# Patient Record
Sex: MALE | Race: WHITE | NOT HISPANIC OR LATINO | Employment: FULL TIME | URBAN - NONMETROPOLITAN AREA
[De-identification: names, ages, dates, MRNs, and addresses within clinical notes are randomized per-mention and may not be internally consistent; named-entity substitution may affect disease eponyms.]

---

## 2020-08-12 ENCOUNTER — TRANSCRIBE ORDERS (OUTPATIENT)
Dept: ADMINISTRATIVE | Facility: HOSPITAL | Age: 45
End: 2020-08-12

## 2020-08-12 DIAGNOSIS — Z01.818 OTHER SPECIFIED PRE-OPERATIVE EXAMINATION: Primary | ICD-10-CM

## 2020-08-14 ENCOUNTER — LAB (OUTPATIENT)
Dept: LAB | Facility: HOSPITAL | Age: 45
End: 2020-08-14

## 2020-08-14 DIAGNOSIS — Z01.818 OTHER SPECIFIED PRE-OPERATIVE EXAMINATION: ICD-10-CM

## 2020-08-14 PROCEDURE — U0002 COVID-19 LAB TEST NON-CDC: HCPCS

## 2020-08-14 PROCEDURE — C9803 HOPD COVID-19 SPEC COLLECT: HCPCS

## 2020-08-14 PROCEDURE — U0004 COV-19 TEST NON-CDC HGH THRU: HCPCS

## 2020-08-16 LAB
REF LAB TEST METHOD: NORMAL
SARS-COV-2 RNA RESP QL NAA+PROBE: NOT DETECTED

## 2020-09-16 ENCOUNTER — TRANSCRIBE ORDERS (OUTPATIENT)
Dept: ADMINISTRATIVE | Facility: HOSPITAL | Age: 45
End: 2020-09-16

## 2020-09-16 DIAGNOSIS — Z01.818 OTHER SPECIFIED PRE-OPERATIVE EXAMINATION: Primary | ICD-10-CM

## 2020-09-18 ENCOUNTER — LAB (OUTPATIENT)
Dept: LAB | Facility: HOSPITAL | Age: 45
End: 2020-09-18

## 2020-09-18 DIAGNOSIS — Z01.818 OTHER SPECIFIED PRE-OPERATIVE EXAMINATION: ICD-10-CM

## 2020-09-18 PROCEDURE — U0004 COV-19 TEST NON-CDC HGH THRU: HCPCS

## 2020-09-18 PROCEDURE — C9803 HOPD COVID-19 SPEC COLLECT: HCPCS

## 2020-09-19 LAB — SARS-COV-2 RNA NOSE QL NAA+PROBE: NOT DETECTED

## 2021-07-26 ENCOUNTER — OFFICE VISIT (OUTPATIENT)
Dept: ORTHOPEDIC SURGERY | Facility: CLINIC | Age: 46
End: 2021-07-26

## 2021-07-26 ENCOUNTER — HOSPITAL ENCOUNTER (OUTPATIENT)
Dept: GENERAL RADIOLOGY | Facility: HOSPITAL | Age: 46
Discharge: HOME | End: 2021-07-26
Admitting: FAMILY MEDICINE

## 2021-07-26 VITALS
HEIGHT: 68 IN | DIASTOLIC BLOOD PRESSURE: 80 MMHG | HEART RATE: 75 BPM | SYSTOLIC BLOOD PRESSURE: 130 MMHG | WEIGHT: 238 LBS | BODY MASS INDEX: 36.07 KG/M2

## 2021-07-26 DIAGNOSIS — G89.29 CHRONIC PAIN OF RIGHT KNEE: Primary | ICD-10-CM

## 2021-07-26 DIAGNOSIS — E11.40 CONTROLLED TYPE 2 DIABETES MELLITUS WITH NEUROPATHY (HCC): ICD-10-CM

## 2021-07-26 DIAGNOSIS — M25.561 CHRONIC PAIN OF RIGHT KNEE: Primary | ICD-10-CM

## 2021-07-26 DIAGNOSIS — M22.2X1 PATELLOFEMORAL PAIN SYNDROME OF RIGHT KNEE: ICD-10-CM

## 2021-07-26 DIAGNOSIS — M25.561 RIGHT KNEE PAIN, UNSPECIFIED CHRONICITY: ICD-10-CM

## 2021-07-26 DIAGNOSIS — M25.561 RIGHT KNEE PAIN, UNSPECIFIED CHRONICITY: Primary | ICD-10-CM

## 2021-07-26 DIAGNOSIS — S80.01XA CONTUSION OF RIGHT KNEE, INITIAL ENCOUNTER: ICD-10-CM

## 2021-07-26 PROBLEM — I10 HTN (HYPERTENSION): Status: ACTIVE | Noted: 2021-07-26

## 2021-07-26 PROCEDURE — 73562 X-RAY EXAM OF KNEE 3: CPT

## 2021-07-26 PROCEDURE — 99203 OFFICE O/P NEW LOW 30 MIN: CPT | Performed by: FAMILY MEDICINE

## 2021-07-26 PROCEDURE — 73562 X-RAY EXAM OF KNEE 3: CPT | Performed by: RADIOLOGY

## 2021-07-26 RX ORDER — BUSPIRONE HYDROCHLORIDE 30 MG/1
30 TABLET ORAL 2 TIMES DAILY
COMMUNITY
Start: 2021-07-02 | End: 2022-10-13 | Stop reason: ALTCHOICE

## 2021-07-26 RX ORDER — LEVOTHYROXINE SODIUM 88 UG/1
88 TABLET ORAL DAILY
COMMUNITY
Start: 2021-07-02

## 2021-07-26 RX ORDER — ROPINIROLE 1 MG/1
1 TABLET, FILM COATED ORAL
COMMUNITY
Start: 2021-07-02 | End: 2022-10-13 | Stop reason: ALTCHOICE

## 2021-07-26 RX ORDER — GABAPENTIN 600 MG/1
600 TABLET ORAL 3 TIMES DAILY
COMMUNITY
Start: 2021-07-02 | End: 2022-10-13 | Stop reason: ALTCHOICE

## 2021-07-26 RX ORDER — CHLORTHALIDONE 25 MG/1
25 TABLET ORAL DAILY
COMMUNITY
Start: 2021-07-02 | End: 2022-10-13 | Stop reason: ALTCHOICE

## 2021-07-26 RX ORDER — IBUPROFEN 800 MG/1
800 TABLET ORAL 3 TIMES DAILY PRN
COMMUNITY
Start: 2021-07-02 | End: 2021-12-03

## 2021-07-26 RX ORDER — ESCITALOPRAM OXALATE 20 MG/1
20 TABLET ORAL DAILY
COMMUNITY
Start: 2021-07-02 | End: 2022-10-13 | Stop reason: ALTCHOICE

## 2021-07-26 RX ORDER — DM/PE/ACETAMINOPHEN/CHLORPHENR 10-5-325-2
1 TABLET, SEQUENTIAL ORAL DAILY
COMMUNITY
Start: 2021-07-02 | End: 2022-10-13 | Stop reason: ALTCHOICE

## 2021-07-26 RX ORDER — AMLODIPINE BESYLATE 10 MG/1
10 TABLET ORAL DAILY
COMMUNITY
Start: 2021-07-02

## 2021-07-26 NOTE — PROGRESS NOTES
"New Patient Visit      Patient: Joe Bobo  YOB: 1975  Date of Encounter: 07/26/2021  PCP: Carol Lainez APRN  Referring Provider: EMILY Wilson     Subjective   Joe Bobo is a 46 y.o. male who presents to the office today for evaluation of Pain, Initial Evaluation, and Edema of the Right Knee      Chief Complaint:   Chief Complaint   Patient presents with   • Right Knee - Pain, Initial Evaluation, Edema       HPI:   HPI  New patient presents complaining of right knee pain that has been going on since a fall in February.  Patient states that he slipped on the ice in the parking lot and landed flat on the front of his right knee.  Did not have much pain initially but fell again on his stairs about 30 minutes later doing the same thing and had significant pain and swelling after the injury.  Reports it lasted about 3 weeks before it finally got better.  Since then has been intermittently aching and swelling.  Sometimes gets the sensation of weakness and \"gives out.  Gets \"popping and cracking but that was going on before the injury.  Trying Voltaren gel which is limited help and takes ibuprofen 800 mg when he is flared up which does help.  Patient is actually feeling good today and has been for 3 to 4 days.  Has some knee braces at home that he wears sometimes.  When it is flared up complains of pain on the joint line and below the kneecap that aches \"like an abscessed tooth\" and sometimes become stabbing pain.  Active Problem List:  Patient Active Problem List   Diagnosis   • Controlled type 2 diabetes mellitus with neuropathy (CMS/HCC)   • HTN (hypertension)       Past Medical History:  Past Medical History:   Diagnosis Date   • Diabetes (CMS/HCC)    • High blood pressure    • Thyroid disease        Past Surgical History:  Past Surgical History:   Procedure Laterality Date   • CATARACT EXTRACTION     • CHOLECYSTECTOMY     • KNEE SURGERY Left     ACL reconstruction       Family " History:  Family History   Problem Relation Age of Onset   • Diabetes Father    • Heart disease Father    • Diabetes Maternal Grandmother    • Osteoarthritis Paternal Grandmother    • Rheum arthritis Paternal Grandmother    • Gout Paternal Grandmother    • Cancer Paternal Grandmother    • Diabetes Paternal Grandmother        Social History:  Social History     Socioeconomic History   • Marital status:      Spouse name: Not on file   • Number of children: Not on file   • Years of education: Not on file   • Highest education level: Not on file   Tobacco Use   • Smoking status: Never Smoker   • Smokeless tobacco: Current User     Types: Snuff   Vaping Use   • Vaping Use: Never used   Substance and Sexual Activity   • Alcohol use: Never       Medications:  Current Outpatient Medications   Medication Sig Dispense Refill   • amLODIPine (NORVASC) 10 MG tablet Take 10 mg by mouth Daily.     • busPIRone (BUSPAR) 30 MG tablet Take 30 mg by mouth 2 (Two) Times a Day.     • chlorthalidone (HYGROTON) 25 MG tablet Take 25 mg by mouth Daily.     • escitalopram (LEXAPRO) 20 MG tablet Take 20 mg by mouth Daily.     • gabapentin (NEURONTIN) 600 MG tablet Take 600 mg by mouth 3 (Three) Times a Day.     • GNP Vitamin D Maximum Strength 50 MCG (2000 UT) tablet Take 1 tablet by mouth Daily.     • ibuprofen (ADVIL,MOTRIN) 800 MG tablet Take 800 mg by mouth 3 (Three) Times a Day As Needed.     • Jardiance 10 MG tablet tablet Take 10 mg by mouth Daily.     • levothyroxine (SYNTHROID, LEVOTHROID) 137 MCG tablet Take 137 mcg by mouth Daily.     • metFORMIN (GLUCOPHAGE) 1000 MG tablet Take 1,000 mg by mouth 2 (Two) Times a Day.     • rOPINIRole (REQUIP) 1 MG tablet Take 1 mg by mouth every night at bedtime.       No current facility-administered medications for this visit.       Allergies:  Allergies   Allergen Reactions   • Lisinopril Hives       Review of Systems:   Review of Systems   Constitutional: Negative for activity change and  "fever.   Respiratory: Negative for shortness of breath and wheezing.    Cardiovascular: Negative for chest pain.   Musculoskeletal: Positive for arthralgias and myalgias.   Skin: Negative for color change and wound.   Neurological: Negative for weakness and numbness.       Physical Exam:   Physical Exam  Vitals and nursing note reviewed.   Constitutional:       General: He is not in acute distress.     Appearance: Normal appearance. He is obese.   Pulmonary:      Effort: Pulmonary effort is normal. No respiratory distress.   Musculoskeletal:      Right knee: Crepitus present. No effusion. Decreased range of motion. Tenderness present. No LCL laxity or MCL laxity. Abnormal patellar mobility.      Instability Tests: Anterior drawer test negative. Posterior drawer test negative. Anterior Lachman test negative. Medial Erlinda test negative and lateral Erlinda test negative.      Comments: Slightly restricted flexion.  Complains of mild discomfort on palpation along the joint line and proximal tibia.  Positive J sign with noticeable patella \"clunking\" on flexion extension.  Negative patellar compression sign.  No pain on resisted testing with normal strength.   Skin:     General: Skin is warm and dry.      Findings: No erythema.   Neurological:      General: No focal deficit present.      Mental Status: He is alert.      Sensory: No sensory deficit.      Motor: No weakness.       GENERAL: 46 y.o. male, alert and oriented X 3 in no acute distress.   Visit Vitals  /80   Pulse 75   Ht 172.7 cm (68\")   Wt 108 kg (238 lb)   BMI 36.19 kg/m²       Radiology Results:    XR Knee 3 View Right    Result Date: 7/26/2021  Negative plain films of the right knee  This report was finalized on 7/26/2021 9:50 AM by Dr. Librado Otto II, MD.      Assessment & Plan:   Assessment/Plan   Diagnoses and all orders for this visit:    1. Chronic pain of right knee (Primary)    2. Controlled type 2 diabetes mellitus with neuropathy " (CMS/[facility])    3. Patellofemoral pain syndrome of right knee    4. Contusion of right knee, initial encounter        MEDICATION ISSUES:  Discussed medication options and treatment plan of prescribed medication as well as the risks, benefits, and side effects including potential falls, possible impaired driving and metabolic adversities among others. Patient is agreeable to call the office with any worsening of symptoms or onset of side effects. Patient is agreeable to call 911 or go to the nearest ER should he/she begin having SI/HI.     MEDS ORDERED DURING VISIT:  No orders of the defined types were placed in this encounter.  Patient's knee is looking pretty good today other than some patellofemoral tracking issues.  I have given him home exercises and a lateral J knee brace without hinges to wear when he is being active.  Patient will follow up in 1 month for reevaluation or sooner if he has another flareup.  Recommend he take his ibuprofen every 8 hours if his knee starts to flareup again.  Consider PT or advanced imaging if pain continues.           This document has been electronically signed by Rich Carrillo DO   July 26, 2021 10:53 EDT    Part of this note may be an electronic transcription/translation of spoken language to printed text using the Dragon Dictation System.

## 2021-08-27 ENCOUNTER — TELEPHONE (OUTPATIENT)
Dept: ORTHOPEDIC SURGERY | Facility: CLINIC | Age: 46
End: 2021-08-27

## 2021-08-27 NOTE — TELEPHONE ENCOUNTER
Called patient to check on him since he missed his appt with Dr. Carrillo this morning. No answer, LVM.

## 2021-09-01 ENCOUNTER — TELEPHONE (OUTPATIENT)
Dept: ORTHOPEDIC SURGERY | Facility: CLINIC | Age: 46
End: 2021-09-01

## 2021-09-01 NOTE — TELEPHONE ENCOUNTER
Caller: ALETHA    Relationship: [facility] IN [place]    Best call back number: [phone]    What form or medical record are you requesting: VISIT NOTES FORM 7/26/2021    Who is requesting this form or medical record from you: PCP OFFICE    How would you like to receive the form or medical records (pick-up, mail, fax): FAX  If fax, what is the fax number: [phone]

## 2021-12-03 ENCOUNTER — HOSPITAL ENCOUNTER (OUTPATIENT)
Dept: GENERAL RADIOLOGY | Facility: HOSPITAL | Age: 46
Discharge: HOME | End: 2021-12-03
Admitting: FAMILY MEDICINE

## 2021-12-03 ENCOUNTER — OFFICE VISIT (OUTPATIENT)
Dept: ORTHOPEDIC SURGERY | Facility: CLINIC | Age: 46
End: 2021-12-03

## 2021-12-03 VITALS
DIASTOLIC BLOOD PRESSURE: 82 MMHG | SYSTOLIC BLOOD PRESSURE: 126 MMHG | HEART RATE: 65 BPM | WEIGHT: 238 LBS | HEIGHT: 68 IN | BODY MASS INDEX: 36.07 KG/M2

## 2021-12-03 DIAGNOSIS — S46.012A ROTATOR CUFF STRAIN, LEFT, INITIAL ENCOUNTER: ICD-10-CM

## 2021-12-03 DIAGNOSIS — M25.512 LEFT SHOULDER PAIN, UNSPECIFIED CHRONICITY: Primary | ICD-10-CM

## 2021-12-03 DIAGNOSIS — M25.512 ACUTE PAIN OF LEFT SHOULDER: Primary | ICD-10-CM

## 2021-12-03 DIAGNOSIS — M19.012 OSTEOARTHRITIS OF LEFT AC (ACROMIOCLAVICULAR) JOINT: ICD-10-CM

## 2021-12-03 DIAGNOSIS — S46.912A STRAIN OF LEFT SHOULDER, INITIAL ENCOUNTER: ICD-10-CM

## 2021-12-03 DIAGNOSIS — M25.512 LEFT SHOULDER PAIN, UNSPECIFIED CHRONICITY: ICD-10-CM
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
## 2021-12-03 DIAGNOSIS — M19.012 PRIMARY OSTEOARTHRITIS OF LEFT SHOULDER: ICD-10-CM

## 2021-12-03 DIAGNOSIS — S46.212A BICEPS STRAIN, LEFT, INITIAL ENCOUNTER: ICD-10-CM

## 2021-12-03 PROCEDURE — 73030 X-RAY EXAM OF SHOULDER: CPT | Performed by: RADIOLOGY

## 2021-12-03 PROCEDURE — 20610 DRAIN/INJ JOINT/BURSA W/O US: CPT | Performed by: FAMILY MEDICINE

## 2021-12-03 PROCEDURE — 99214 OFFICE O/P EST MOD 30 MIN: CPT | Performed by: FAMILY MEDICINE

## 2021-12-03 PROCEDURE — 73030 X-RAY EXAM OF SHOULDER: CPT

## 2021-12-03 RX ORDER — CELECOXIB 100 MG/1
100 CAPSULE ORAL 2 TIMES DAILY
Qty: 60 CAPSULE | Refills: 1 | Status: SHIPPED | OUTPATIENT
Start: 2021-12-03 | End: 2021-12-20 | Stop reason: SDUPTHER

## 2021-12-03 RX ORDER — PREGABALIN 100 MG/1
100 CAPSULE ORAL 2 TIMES DAILY
COMMUNITY

## 2021-12-03 RX ORDER — TRIAMCINOLONE ACETONIDE 40 MG/ML
40 INJECTION, SUSPENSION INTRA-ARTICULAR; INTRAMUSCULAR
Status: COMPLETED | OUTPATIENT
Start: 2021-12-03 | End: 2021-12-03

## 2021-12-03 RX ORDER — BUPIVACAINE HYDROCHLORIDE 5 MG/ML
4 INJECTION, SOLUTION EPIDURAL; INTRACAUDAL
Status: COMPLETED | OUTPATIENT
Start: 2021-12-03 | End: 2021-12-03

## 2021-12-03 RX ADMIN — TRIAMCINOLONE ACETONIDE 40 MG: 40 INJECTION, SUSPENSION INTRA-ARTICULAR; INTRAMUSCULAR at 12:42

## 2021-12-03 RX ADMIN — BUPIVACAINE HYDROCHLORIDE 4 ML: 5 INJECTION, SOLUTION EPIDURAL; INTRACAUDAL at 12:42

## 2021-12-03 NOTE — PROGRESS NOTES
Follow Up Visit      Patient: Joe Bobo  YOB: 1975  Date of Encounter: 12/03/2021  PCP: Carol Lainez APRN  Referring Provider: No ref. provider found     Subjective   Joe Bobo is a 46 y.o. male who presents to the office today for evaluation of Pain and New Problem of the Left Shoulder      Chief Complaint   Patient presents with   • Left Shoulder - Pain, New Problem       HPI  An established patient presents for new problem complaining of left shoulder pain going on for the last 6 weeks and getting worse.  Patient has had 2 falls in the last 6 weeks which resulted in him landing directly onto the shoulder.  He did not catch himself.  Getting significant pain in the anterior lateral shoulder as of late.  Limited range of motion in AA in the front of the shoulder.  Hurts when he tries to raise it.  Gets a stabbing pain that radiates down into the bicep and deltoid.  Already on NSAIDs and pain medicine which is not helping.  The knee which she was seen previously for is doing well but he does need a bigger knee brace.  Thinks his other one is too small  Patient Active Problem List   Diagnosis   • Controlled type 2 diabetes mellitus with neuropathy (HCC)   • HTN (hypertension)   • Osteoarthritis of left AC (acromioclavicular) joint   • Primary osteoarthritis of left shoulder       Past Medical History:   Diagnosis Date   • Diabetes (HCC)    • High blood pressure    • Thyroid disease        Allergies   Allergen Reactions   • Lisinopril Hives       Review of Systems   Constitutional: Positive for activity change. Negative for fever.   Respiratory: Negative for shortness of breath and wheezing.    Cardiovascular: Negative for chest pain.   Musculoskeletal: Positive for arthralgias and myalgias. Negative for neck pain and neck stiffness.   Skin: Negative for color change and wound.   Neurological: Negative for weakness and numbness.       Visit Vitals  /82   Pulse 65   Ht 172.7 cm  "(68\")   Wt 108 kg (238 lb)   BMI 36.19 kg/m²     46 y.o.male  Physical Exam  Vitals and nursing note reviewed.   Constitutional:       General: He is not in acute distress.     Appearance: Normal appearance.   Pulmonary:      Effort: Pulmonary effort is normal. No respiratory distress.   Musculoskeletal:      Left shoulder: Tenderness present. Decreased range of motion. Decreased strength. Normal pulse.      Cervical back: No spasms or tenderness. No pain with movement. Normal range of motion.      Comments: Left shoulder: Tender over AC, anterior joint biceps tendon and coracoid.  Noticeable pain on resisted testing of biceps and supraspinatus with supraspinatus weakness but appear to be intact.  Mild pain without weakness on testing of other shoulder muscles.  Pain on impingement crossover testing.  Fairly restricted active range of motion in elevation and abduction particularly but notably improved on passive range of motion.    Somewhat improved after injection but still have noticeable pain and weakness of supraspinatus.   Skin:     General: Skin is warm and dry.      Findings: No erythema.   Neurological:      General: No focal deficit present.      Mental Status: He is alert.      Sensory: No sensory deficit.      Motor: No weakness.         Radiology Results:    XR Shoulder 2+ View Left    Result Date: 12/3/2021  Mild arthritic changes in the left shoulder.  This report was finalized on 12/3/2021 2:16 PM by Dr. Librado Otto II, MD.         X-ray left shoulder: 12/3/21: My preliminary interpretation: Mild degenerative changes of AC and glenohumeral joints with inferior humeral head spurring.  No acute fractures or bony abnormalities      Large Joint Arthrocentesis: L subacromial bursa  Date/Time: 12/3/2021 12:42 PM  Consent given by: patient  Site marked: site marked  Timeout: Immediately prior to procedure a time out was called to verify the correct patient, procedure, equipment, support staff and " site/side marked as required   Supporting Documentation  Indications: pain   Procedure Details  Location: shoulder - L subacromial bursa  Needle size: 22 G  Approach: posterior  Medications administered: 40 mg triamcinolone acetonide 40 MG/ML; 4 mL bupivacaine (PF) 0.5 %  Patient tolerance: patient tolerated the procedure well with no immediate complications       Injection site in the posterior shoulder was cleaned with alcohol and iodine.  Anesthesia with ethyl chloride.  Then using sterile technique the subacromial space was accessed with a 22-gauge 1.5 inch needle injected with 4 cc of 0.5% Marcaine and 40 mg of Kenalog.  Injection site was covered with sterile bandage.  There were no immediate adverse effects and negligible blood loss..  Follow-up care and precautions were discussed.    Assessment/Plan   Diagnoses and all orders for this visit:    1. Acute pain of left shoulder (Primary)  -     celecoxib (CeleBREX) 100 MG capsule; Take 1 capsule by mouth 2 (Two) Times a Day.  Dispense: 60 capsule; Refill: 1  -     Large Joint Arthrocentesis: L subacromial bursa    2. Strain of left shoulder, initial encounter  -     celecoxib (CeleBREX) 100 MG capsule; Take 1 capsule by mouth 2 (Two) Times a Day.  Dispense: 60 capsule; Refill: 1  -     Large Joint Arthrocentesis: L subacromial bursa    3. Biceps strain, left, initial encounter  -     celecoxib (CeleBREX) 100 MG capsule; Take 1 capsule by mouth 2 (Two) Times a Day.  Dispense: 60 capsule; Refill: 1  -     Large Joint Arthrocentesis: L subacromial bursa    4. Rotator cuff strain, left, initial encounter  -     celecoxib (CeleBREX) 100 MG capsule; Take 1 capsule by mouth 2 (Two) Times a Day.  Dispense: 60 capsule; Refill: 1  -     Large Joint Arthrocentesis: L subacromial bursa    5. Injury due to fall, initial encounter  -     celecoxib (CeleBREX) 100 MG capsule; Take 1 capsule by mouth 2 (Two) Times a Day.  Dispense: 60 capsule; Refill: 1  -     Large Joint  Arthrocentesis: L subacromial bursa    6. Primary osteoarthritis of left shoulder  -     celecoxib (CeleBREX) 100 MG capsule; Take 1 capsule by mouth 2 (Two) Times a Day.  Dispense: 60 capsule; Refill: 1  -     Large Joint Arthrocentesis: L subacromial bursa    7. Osteoarthritis of left AC (acromioclavicular) joint  -     celecoxib (CeleBREX) 100 MG capsule; Take 1 capsule by mouth 2 (Two) Times a Day.  Dispense: 60 capsule; Refill: 1  -     Large Joint Arthrocentesis: L subacromial bursa         MEDS ORDERED DURING VISIT:  New Medications Ordered This Visit   Medications   • celecoxib (CeleBREX) 100 MG capsule     Sig: Take 1 capsule by mouth 2 (Two) Times a Day.     Dispense:  60 capsule     Refill:  1     MEDICATION ISSUES:  Discussed medication options and treatment plan of prescribed medication as well as the risks, benefits, and side effects including potential falls, possible impaired driving and metabolic adversities among others. Patient is agreeable to call the office with any worsening of symptoms or onset of side effects. Patient is agreeable to call 911 or go to the nearest ER should he/she begin having SI/HI.     Discussion:  Steroid injection today in the office with some relief of pain.  Patient given home exercises and changed his NSAID to Celebrex.  Cautioned patient about use of NSAIDs with SSRIs and discussed signs and symptoms of GI bleed to watch for.  Patient already using topical NSAID which is not helping.  Has been taking ibuprofen without issue.  Follow-up in 2 weeks if not improving consider PT or further intervention.  Content consider MRI if continues to have supraspinatus weakness or does not respond to conservative therapy.             This document has been electronically signed by Rich Carrillo DO   December 3, 2021 14:20 EST    Part of this note may be an electronic transcription/translation of spoken language to printed text using the Dragon Dictation System.

## 2021-12-20 ENCOUNTER — OFFICE VISIT (OUTPATIENT)
Dept: ORTHOPEDIC SURGERY | Facility: CLINIC | Age: 46
End: 2021-12-20

## 2021-12-20 ENCOUNTER — TELEPHONE (OUTPATIENT)
Dept: ORTHOPEDIC SURGERY | Facility: CLINIC | Age: 46
End: 2021-12-20

## 2021-12-20 VITALS
BODY MASS INDEX: 36.07 KG/M2 | HEART RATE: 65 BPM | WEIGHT: 238 LBS | HEIGHT: 68 IN | SYSTOLIC BLOOD PRESSURE: 122 MMHG | DIASTOLIC BLOOD PRESSURE: 80 MMHG

## 2021-12-20 DIAGNOSIS — M25.512 ACUTE PAIN OF LEFT SHOULDER: Primary | ICD-10-CM

## 2021-12-20 DIAGNOSIS — M19.012 OSTEOARTHRITIS OF LEFT AC (ACROMIOCLAVICULAR) JOINT: ICD-10-CM
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
## 2021-12-20 DIAGNOSIS — S46.112A RUPTURE LONG HEAD BICEPS TENDON, LEFT, INITIAL ENCOUNTER: ICD-10-CM

## 2021-12-20 DIAGNOSIS — S46.912A STRAIN OF LEFT SHOULDER, INITIAL ENCOUNTER: ICD-10-CM

## 2021-12-20 DIAGNOSIS — M19.012 PRIMARY OSTEOARTHRITIS OF LEFT SHOULDER: ICD-10-CM

## 2021-12-20 DIAGNOSIS — S46.012A ROTATOR CUFF STRAIN, LEFT, INITIAL ENCOUNTER: ICD-10-CM

## 2021-12-20 DIAGNOSIS — S46.212A BICEPS STRAIN, LEFT, INITIAL ENCOUNTER: ICD-10-CM

## 2021-12-20 PROCEDURE — 76882 US LMTD JT/FCL EVL NVASC XTR: CPT | Performed by: FAMILY MEDICINE

## 2021-12-20 PROCEDURE — 99214 OFFICE O/P EST MOD 30 MIN: CPT | Performed by: FAMILY MEDICINE

## 2021-12-20 RX ORDER — CELECOXIB 200 MG/1
200 CAPSULE ORAL 2 TIMES DAILY
Qty: 60 CAPSULE | Refills: 1 | Status: SHIPPED | OUTPATIENT
Start: 2021-12-20 | End: 2022-10-13 | Stop reason: ALTCHOICE

## 2021-12-20 NOTE — TELEPHONE ENCOUNTER
Called patient but someone else answered the phone and I told her that I will try again later, what I wanted to tell him is to let him know that he has met his deductible but out of pocket has not been met. Dr. Carrillo had ordered an MRI so either way he will be paying something.

## 2021-12-20 NOTE — TELEPHONE ENCOUNTER
Called and said he looked at his deductible and it said the balance is zero. I mentioned to him that the out of pocket he still owed on. He is going to wait till after the new year to do the MRI

## 2021-12-20 NOTE — PROGRESS NOTES
"Follow Up Visit      Patient: Joe Bobo  YOB: 1975  Date of Encounter: 12/20/2021  PCP: Carol Lainez APRN  Referring Provider: No ref. provider found     Subjective   Joe Bobo is a 46 y.o. male who presents to the office today for evaluation of Follow-up and Pain of the Left Shoulder      Chief Complaint   Patient presents with   • Left Shoulder - Follow-up, Pain       HPI  Patient presents for follow-up for left shoulder pain and strain.  Had a steroid injection in the subacromial space last visit.  Reports that he did have significant improvement in his global shoulder pain after the steroid.  He is able to move easier and with better range of motion.  He is now noticing that he still having significant pain in the anterior shoulder and down the bicep with a \"pulling sensation\" when he raises his arm.  Celebrex is helpful but is not quite doing enough.  Still getting significant pain when doing certain motions including lifting and pulling  Patient Active Problem List   Diagnosis   • Controlled type 2 diabetes mellitus with neuropathy (HCC)   • HTN (hypertension)   • Osteoarthritis of left AC (acromioclavicular) joint   • Primary osteoarthritis of left shoulder       Past Medical History:   Diagnosis Date   • Diabetes (HCC)    • High blood pressure    • Thyroid disease        Allergies   Allergen Reactions   • Lisinopril Hives       Review of Systems   Constitutional: Positive for activity change. Negative for fever.   Respiratory: Negative for shortness of breath and wheezing.    Cardiovascular: Negative for chest pain.   Musculoskeletal: Positive for arthralgias and myalgias. Negative for neck pain and neck stiffness.   Skin: Negative for color change and wound.   Neurological: Negative for weakness and numbness.       Visit Vitals  /80   Pulse 65   Ht 172.7 cm (68\")   Wt 108 kg (238 lb)   BMI 36.19 kg/m²     46 y.o.male  Physical Exam  Vitals and nursing note reviewed. " "  Constitutional:       General: He is not in acute distress.     Appearance: Normal appearance.   Pulmonary:      Effort: Pulmonary effort is normal. No respiratory distress.   Musculoskeletal:      Left shoulder: Tenderness present. Decreased range of motion. Decreased strength. Normal pulse.      Cervical back: No spasms or tenderness. No pain with movement. Normal range of motion.      Comments: Left shoulder: Tender over biceps tendon and coracoid.  Noticeable pain on resisted testing of biceps and supraspinatus with supraspinatus weakness but appear to be intact. \"David arm\" bicep deformity.  painless and without weakness on testing of other shoulder muscles.  Negative impingement and crossover testing.  Fairly restricted active range of motion in elevation and abduction particularly with significant pain.     Skin:     General: Skin is warm and dry.      Findings: No erythema.   Neurological:      General: No focal deficit present.      Mental Status: He is alert.      Sensory: No sensory deficit.      Motor: No weakness.     Procedure:   MSK ultrasound Limited- left shoulder  Consent given by patient for in-office study.  Images saved locally to machine memory  Indication: Left shoulder pain after fall.  Concern for tendon rupture on exam    1.  Abnormal biceps tendon with significant fluid in tendon sheath.  Fragmented heterogenous appearance of tendon consistent with tear  2.  AC joint degenerative changes  3. Supraspinatus: Abnormal heterogenous appearance concerning for partial tearing    Radiology Results:    XR Shoulder 2+ View Left    Result Date: 12/3/2021  Mild arthritic changes in the left shoulder.  This report was finalized on 12/3/2021 2:16 PM by Dr. Librado Otto II, MD.        Assessment/Plan   Diagnoses and all orders for this visit:    1. Acute pain of left shoulder (Primary)  -     celecoxib (CeleBREX) 200 MG capsule; Take 1 capsule by mouth 2 (Two) Times a Day.  Dispense: 60 capsule; " Refill: 1  -     MRI Shoulder Left Without Contrast; Future    2. Strain of left shoulder, initial encounter  -     celecoxib (CeleBREX) 200 MG capsule; Take 1 capsule by mouth 2 (Two) Times a Day.  Dispense: 60 capsule; Refill: 1  -     MRI Shoulder Left Without Contrast; Future    3. Biceps strain, left, initial encounter  -     celecoxib (CeleBREX) 200 MG capsule; Take 1 capsule by mouth 2 (Two) Times a Day.  Dispense: 60 capsule; Refill: 1  -     MRI Shoulder Left Without Contrast; Future    4. Rotator cuff strain, left, initial encounter  -     celecoxib (CeleBREX) 200 MG capsule; Take 1 capsule by mouth 2 (Two) Times a Day.  Dispense: 60 capsule; Refill: 1  -     MRI Shoulder Left Without Contrast; Future    5. Injury due to fall, initial encounter  -     celecoxib (CeleBREX) 200 MG capsule; Take 1 capsule by mouth 2 (Two) Times a Day.  Dispense: 60 capsule; Refill: 1  -     MRI Shoulder Left Without Contrast; Future    6. Primary osteoarthritis of left shoulder  -     celecoxib (CeleBREX) 200 MG capsule; Take 1 capsule by mouth 2 (Two) Times a Day.  Dispense: 60 capsule; Refill: 1  -     MRI Shoulder Left Without Contrast; Future    7. Osteoarthritis of left AC (acromioclavicular) joint  -     celecoxib (CeleBREX) 200 MG capsule; Take 1 capsule by mouth 2 (Two) Times a Day.  Dispense: 60 capsule; Refill: 1  -     MRI Shoulder Left Without Contrast; Future    8. Rupture long head biceps tendon, left, initial encounter  -     MRI Shoulder Left Without Contrast; Future         MEDS ORDERED DURING VISIT:  No orders of the defined types were placed in this encounter.    MEDICATION ISSUES:  Discussed medication options and treatment plan of prescribed medication as well as the risks, benefits, and side effects including potential falls, possible impaired driving and metabolic adversities among others. Patient is agreeable to call the office with any worsening of symptoms or onset of side effects. Patient is  agreeable to call 911 or go to the nearest ER should he/she begin having SI/HI.     Discussion:  Increase Celebrex to 200 mg twice daily.  Ideally will not be on this dose for long-term    Referred to physical therapy for left shoulder    MRI left shoulder ordered due to rupture of left bicep tendon seen on MSK US in office and concern for injury to supraspinatus/rotator cuff and labrum.  Follow-up after 2 weeks of PT or after MRI which ever comes first.  Consider further intervention or surgical referral depending on image results and response to therapy.           This document has been electronically signed by Rich Carrillo DO   December 20, 2021 14:26 EST    Part of this note may be an electronic transcription/translation of spoken language to printed text using the Dragon Dictation System.

## 2022-10-13 ENCOUNTER — OFFICE VISIT (OUTPATIENT)
Dept: CARDIOLOGY | Facility: CLINIC | Age: 47
End: 2022-10-13

## 2022-10-13 VITALS
HEART RATE: 74 BPM | DIASTOLIC BLOOD PRESSURE: 84 MMHG | WEIGHT: 215 LBS | HEIGHT: 68 IN | BODY MASS INDEX: 32.58 KG/M2 | SYSTOLIC BLOOD PRESSURE: 133 MMHG | OXYGEN SATURATION: 96 %

## 2022-10-13 DIAGNOSIS — R01.1 HEART MURMUR: Primary | ICD-10-CM

## 2022-10-13 DIAGNOSIS — I10 ESSENTIAL HYPERTENSION: ICD-10-CM

## 2022-10-13 PROCEDURE — 99203 OFFICE O/P NEW LOW 30 MIN: CPT | Performed by: INTERNAL MEDICINE

## 2022-10-13 PROCEDURE — 93000 ELECTROCARDIOGRAM COMPLETE: CPT | Performed by: INTERNAL MEDICINE

## 2022-10-13 RX ORDER — BUPROPION HYDROCHLORIDE 150 MG/1
150 TABLET ORAL DAILY
COMMUNITY

## 2022-10-13 RX ORDER — HYDROCHLOROTHIAZIDE 12.5 MG/1
12.5 TABLET ORAL DAILY
COMMUNITY

## 2022-10-13 RX ORDER — ATORVASTATIN CALCIUM 20 MG/1
20 TABLET, FILM COATED ORAL DAILY
COMMUNITY

## 2022-10-13 RX ORDER — ASPIRIN 81 MG/1
81 TABLET ORAL DAILY
COMMUNITY

## 2022-10-13 NOTE — PROGRESS NOTES
Sherley Hopkins, APRN  Joe Bobo  1975  10/13/2022    Patient Active Problem List   Diagnosis   • Controlled type 2 diabetes mellitus with neuropathy (HCC)   • HTN (hypertension)   • Osteoarthritis of left AC (acromioclavicular) joint   • Primary osteoarthritis of left shoulder       Dear Sherley Hopkins, APRN:    Subjective     Joe Bobo is a 47 y.o. male with the problems as listed above, presents    Chief complaint: Recent finding of heart murmur at his PCPs office.    History of Present Illness: Mr. Bobo is a pleasant 47-year-old  male who was recently diagnosed to have a heart murmur at his PCPs office during recent office evaluation.  Hence he is referred to us for further cardiac evaluation management.  On further questioning he denies any complaints of shortness of breath or dizziness or syncope.  He denies any recent chest pains.  He has history of longstanding hypertension and diabetes mellitus.      Allergies   Allergen Reactions   • Lisinopril Hives       Current Outpatient Medications:   •  amLODIPine (NORVASC) 10 MG tablet, Take 10 mg by mouth Daily., Disp: , Rfl:   •  aspirin 81 MG EC tablet, Take 1 tablet by mouth Daily., Disp: , Rfl:   •  atorvastatin (LIPITOR) 20 MG tablet, Take 1 tablet by mouth Daily., Disp: , Rfl:   •  buPROPion XL (WELLBUTRIN XL) 150 MG 24 hr tablet, Take 1 tablet by mouth Daily., Disp: , Rfl:   •  empagliflozin (JARDIANCE) 25 MG tablet tablet, Take 1 tablet by mouth Daily., Disp: , Rfl:   •  hydroCHLOROthiazide (HYDRODIURIL) 12.5 MG tablet, Take 1 tablet by mouth Daily., Disp: , Rfl:   •  levothyroxine (SYNTHROID, LEVOTHROID) 88 MCG tablet, Take 1 tablet by mouth Daily., Disp: , Rfl:   •  metFORMIN (GLUCOPHAGE) 1000 MG tablet, Take 1,000 mg by mouth 2 (Two) Times a Day., Disp: , Rfl:   •  pregabalin (LYRICA) 100 MG capsule, Take 100 mg by mouth 2 (Two) Times a Day., Disp: , Rfl:     Past Medical History:   Diagnosis Date   • Diabetes (HCC)   "  • High blood pressure    • Hyperlipidemia    • Thyroid disease      Past Surgical History:   Procedure Laterality Date   • CATARACT EXTRACTION     • CHOLECYSTECTOMY     • KNEE SURGERY Left     ACL reconstruction     Family History   Problem Relation Age of Onset   • Diabetes Father    • Heart disease Father    • Diabetes Maternal Grandmother    • Osteoarthritis Paternal Grandmother    • Rheum arthritis Paternal Grandmother    • Gout Paternal Grandmother    • Cancer Paternal Grandmother    • Diabetes Paternal Grandmother      Social History     Tobacco Use   • Smoking status: Never   • Smokeless tobacco: Current     Types: Snuff   Vaping Use   • Vaping Use: Never used   Substance Use Topics   • Alcohol use: Never       Review of Systems   Constitutional: Positive for fever and malaise/fatigue.   HENT: Positive for hearing loss.    Eyes: Negative.    Cardiovascular: Negative.    Respiratory: Positive for shortness of breath.    Endocrine: Negative.    Hematologic/Lymphatic: Negative.    Skin: Negative.    Musculoskeletal: Positive for back pain, muscle cramps and myalgias.   Gastrointestinal: Negative.    Genitourinary: Negative.    Neurological: Positive for dizziness, headaches and numbness.   Psychiatric/Behavioral: Positive for depression. The patient has insomnia.        Objective   Blood pressure 133/84, pulse 74, height 172.7 cm (68\"), weight 97.5 kg (215 lb), SpO2 96 %.  Body mass index is 32.69 kg/m².      Vitals reviewed.   Constitutional:       Appearance: Well-developed.   Eyes:      Conjunctiva/sclera: Conjunctivae normal.   HENT:      Head: Normocephalic.   Neck:      Thyroid: No thyromegaly.      Vascular: No JVD.      Trachea: No tracheal deviation.   Pulmonary:      Effort: No respiratory distress.      Breath sounds: Normal breath sounds. No wheezing. No rales.   Cardiovascular:      PMI at left midclavicular line. Normal rate. Regular rhythm. Normal S1. Normal S2.      Murmurs: There is a mid " frequency midsystolic murmur at the URSB and LLSB.      No gallop. No click. No rub.   Pulses:     Intact distal pulses.   Edema:     Peripheral edema absent.   Abdominal:      General: Bowel sounds are normal.      Palpations: Abdomen is soft. There is no abdominal mass.      Tenderness: There is no abdominal tenderness.   Musculoskeletal:      Cervical back: Normal range of motion and neck supple. Skin:     General: Skin is warm and dry.   Neurological:      Mental Status: Alert and oriented to person, place, and time.      Cranial Nerves: No cranial nerve deficit.                 ECG 12 Lead    Date/Time: 10/13/2022 2:20 PM  Performed by: Andrew Jernigan MD  Authorized by: Andrew Jernigan MD   Rhythm: sinus rhythm  BPM: 74  Conduction: left anterior fascicular block  ST Segments: ST segments normal                Assessment & Plan :   Diagnosis Plan   1. Heart murmur (systolic)     2. Essential hypertension          Recommendations:  Orders Placed This Encounter   Procedures   • ECG 12 Lead   • Adult Transthoracic Echo Complete w/ Color, Spectral and Contrast if necessary per protocol        We will evaluatehis heart murmur further with an echo Doppler study.    Return in about 3 months (around 1/13/2023).    As always, I appreciate very much the opportunity to participate in the cardiovascular care of your patients.      With Best Regards,    Andrew Jernigan MD, [facility]    Dragon disclaimer:  Much of this encounter note is an electronic transcription/translation of spoken language to printed text. The electronic translation of spoken language may permit erroneous, or at times, nonsensical words or phrases to be inadvertently transcribed; Although I have reviewed the note for such errors, some may still exist.

## 2022-11-16 ENCOUNTER — HOSPITAL ENCOUNTER (OUTPATIENT)
Dept: CARDIOLOGY | Facility: HOSPITAL | Age: 47
Discharge: HOME | End: 2022-11-16
Admitting: INTERNAL MEDICINE

## 2022-11-16 DIAGNOSIS — R01.1 HEART MURMUR: ICD-10-CM

## 2022-11-16 PROCEDURE — 93306 TTE W/DOPPLER COMPLETE: CPT

## 2022-11-16 PROCEDURE — 93306 TTE W/DOPPLER COMPLETE: CPT | Performed by: INTERNAL MEDICINE

## 2022-11-21 LAB
BH CV ECHO MEAS - AO ROOT DIAM: 3.7 CM
BH CV ECHO MEAS - EDV(CUBED): 85.2 ML
BH CV ECHO MEAS - EDV(MOD-SP4): 34.2 ML
BH CV ECHO MEAS - EF(MOD-SP4): 65.2 %
BH CV ECHO MEAS - ESV(CUBED): 50.7 ML
BH CV ECHO MEAS - ESV(MOD-SP4): 11.9 ML
BH CV ECHO MEAS - FS: 15.9 %
BH CV ECHO MEAS - IVS/LVPW: 2 CM
BH CV ECHO MEAS - IVSD: 2 CM
BH CV ECHO MEAS - LA DIMENSION: 4.3 CM
BH CV ECHO MEAS - LAT PEAK E' VEL: 11.3 CM/SEC
BH CV ECHO MEAS - LV DIASTOLIC VOL/BSA (35-75): 16.2 CM2
BH CV ECHO MEAS - LV MASS(C)D: 266.9 GRAMS
BH CV ECHO MEAS - LV SYSTOLIC VOL/BSA (12-30): 5.6 CM2
BH CV ECHO MEAS - LVIDD: 4.4 CM
BH CV ECHO MEAS - LVIDS: 3.7 CM
BH CV ECHO MEAS - LVOT AREA: 5.3 CM2
BH CV ECHO MEAS - LVOT DIAM: 2.6 CM
BH CV ECHO MEAS - LVPWD: 1 CM
BH CV ECHO MEAS - MED PEAK E' VEL: 8.6 CM/SEC
BH CV ECHO MEAS - MV A MAX VEL: 98.5 CM/SEC
BH CV ECHO MEAS - MV E MAX VEL: 78.2 CM/SEC
BH CV ECHO MEAS - MV E/A: 0.79
BH CV ECHO MEAS - PA ACC TIME: 0.12 SEC
BH CV ECHO MEAS - PA PR(ACCEL): 23.7 MMHG
BH CV ECHO MEAS - SI(MOD-SP4): 10.6 ML/M2
BH CV ECHO MEAS - SV(MOD-SP4): 22.3 ML
BH CV ECHO MEASUREMENTS AVERAGE E/E' RATIO: 7.86
LEFT ATRIUM VOLUME INDEX: 30.1 ML/M2
MAXIMAL PREDICTED HEART RATE: 172 BPM
STRESS TARGET HR: 146 BPM

## 2023-01-18 ENCOUNTER — TELEPHONE (OUTPATIENT)
Dept: CARDIOLOGY | Facility: CLINIC | Age: 48
End: 2023-01-18

## 2023-01-18 ENCOUNTER — OFFICE VISIT (OUTPATIENT)
Dept: CARDIOLOGY | Facility: CLINIC | Age: 48
End: 2023-01-18
Payer: COMMERCIAL

## 2023-01-18 VITALS
HEIGHT: 68 IN | OXYGEN SATURATION: 96 % | SYSTOLIC BLOOD PRESSURE: 132 MMHG | DIASTOLIC BLOOD PRESSURE: 80 MMHG | WEIGHT: 215 LBS | HEART RATE: 75 BPM | BODY MASS INDEX: 32.58 KG/M2

## 2023-01-18 DIAGNOSIS — I42.2 ASYMMETRIC SEPTAL HYPERTROPHY: Primary | ICD-10-CM

## 2023-01-18 DIAGNOSIS — I10 ESSENTIAL HYPERTENSION: ICD-10-CM

## 2023-01-18 PROCEDURE — 99214 OFFICE O/P EST MOD 30 MIN: CPT | Performed by: NURSE PRACTITIONER

## 2023-01-18 RX ORDER — METOPROLOL SUCCINATE 25 MG/1
25 TABLET, EXTENDED RELEASE ORAL DAILY
Qty: 30 TABLET | Refills: 5 | Status: SHIPPED | OUTPATIENT
Start: 2023-01-18

## 2023-01-18 NOTE — LETTER
January 18, 2023     EMILY Rodriguez  [address] [id number]    Patient: Joe Bobo   YOB: 1975   Date of Visit: 1/18/2023       Dear EMILY Mendoza:    Thank you for referring Joe Bobo to me for evaluation. Below are the relevant portions of my assessment and plan of care.    If you have questions, please do not hesitate to call me. I look forward to following Joe along with you.         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 male with a past medical history of hypertension, hypothyroidism, diabetes mellitus type 2.  Initially, he was referred for a cardiac murmur.  His LVEF was 61 to 65% with grade 1 LV diastolic dysfunction and mild septal asymmetric hypertrophy.  LVOT gradient was 50 mmHg.  It was trace aortic valve regurgitation and trace mitral valve regurgitation.  He denies any chest pains, shortness of breath, palpitations, near-syncope, or syncope.          Allergies   Allergen Reactions   • Lisinopril Hives   :      Current Outpatient Medications:   •  amLODIPine (NORVASC) 10 MG tablet, Take 10 mg by mouth Daily., Disp: , Rfl:   •  aspirin 81 MG EC tablet, Take 1 tablet by mouth Daily., Disp: , Rfl:   •  atorvastatin (LIPITOR) 20 MG tablet, Take 1 tablet by mouth Daily., Disp: , Rfl:   •  buPROPion XL (WELLBUTRIN XL) 150 MG 24 hr tablet, Take 1 tablet by mouth Daily.,  "Disp: , Rfl:   •  empagliflozin (JARDIANCE) 25 MG tablet tablet, Take 1 tablet by mouth Daily., Disp: , Rfl:   •  hydroCHLOROthiazide (HYDRODIURIL) 12.5 MG tablet, Take 1 tablet by mouth Daily., Disp: , Rfl:   •  levothyroxine (SYNTHROID, LEVOTHROID) 88 MCG tablet, Take 1 tablet by mouth Daily., Disp: , Rfl:   •  metFORMIN (GLUCOPHAGE) 1000 MG tablet, Take 1,000 mg by mouth 2 (Two) Times a Day., Disp: , Rfl:   •  pregabalin (LYRICA) 100 MG capsule, Take 100 mg by mouth 2 (Two) Times a Day., Disp: , Rfl:   •  metoprolol succinate XL (TOPROL-XL) 25 MG 24 hr tablet, Take 1 tablet by mouth Daily., Disp: 30 tablet, Rfl: 5      The following portions of the patient's history were reviewed and updated as appropriate: allergies, current medications, past family history, past medical history, past social history, past surgical history and problem list.    Social History     Tobacco Use   • Smoking status: Never   • Smokeless tobacco: Current     Types: Snuff   Vaping Use   • Vaping Use: Never used   Substance Use Topics   • Alcohol use: Never       Review of Systems   Constitutional: Negative for decreased appetite and malaise/fatigue.   Cardiovascular: Negative for chest pain, dyspnea on exertion and palpitations.   Respiratory: Negative for cough and shortness of breath.        Objective    Vitals:    01/18/23 0824   BP: 132/80   Pulse: 75   SpO2: 96%   Weight: 97.5 kg (215 lb)   Height: 172.7 cm (67.99\")     Body mass index is 32.7 kg/m².        Vitals reviewed.   Constitutional:       Appearance: Healthy appearance. Well-developed and not in distress.   HENT:      Head: Normocephalic and atraumatic.   Pulmonary:      Effort: Pulmonary effort is normal.      Breath sounds: Normal breath sounds. No wheezing. No rales.   Cardiovascular:      Normal rate. Regular rhythm.      Murmurs: There is a midsystolic murmur.      . No S3 and S4 gallop.   Edema:     Peripheral edema absent.   Abdominal:      General: Bowel sounds are " normal.      Palpations: Abdomen is soft.   Skin:     General: Skin is warm and dry.   Neurological:      Mental Status: Alert, oriented to person, place, and time and oriented to person, place and time.   Psychiatric:         Mood and Affect: Mood normal.         Behavior: Behavior normal.         Procedures      Assessment & Plan    Diagnosis Plan   1. Asymmetric septal hypertrophy (HCC)  metoprolol succinate XL (TOPROL-XL) 25 MG 24 hr tablet      2. Essential hypertension                    Recommendations:    1. Asymmetric septal hypertrophy-I did discuss his plan of care with Dr. Jernigan.  He recommends initiating beta-blocker therapy at this time.  If patient has any palpitations, near-syncope or syncope in the future, would evaluate further with an event monitor.  Will order metoprolol succinate 25 mg daily.  2. Essential hypertension-well-controlled.  3. Follow-up in 3 months or sooner if needed.        Return in about 3 months (around 4/18/2023) for Recheck.    As always, I appreciate very much the opportunity to participate in the cardiovascular care of your patients.      With Best Regards,    EMILY Coats

## 2023-01-18 NOTE — TELEPHONE ENCOUNTER
Caller: Joe Bobo    Relationship: Self    Best call back number: [phone]    What is the best time to reach you: ANY     Who are you requesting to speak with (clinical staff, provider,  specific staff member): ANY     Do you know the name of the person who called:     What was the call regarding: PT CALLED TO INFORM LEYDI OF MEDICATION CHANGES. HE IS NO LONGER TAKING JARDIANCE 25MG. HE HAS BEEN TAKING TRULICITY 1.5MG 1/WEEK AND LIPITOR 25 MG 1/DAY.     Do you require a callback: ONLY IF NECESSARY

## 2023-01-18 NOTE — PROGRESS NOTES
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 male with a past medical history of hypertension, hypothyroidism, diabetes mellitus type 2.  Initially, he was referred for a cardiac murmur.  His LVEF was 61 to 65% with grade 1 LV diastolic dysfunction and mild septal asymmetric hypertrophy.  LVOT gradient was 50 mmHg.  It was trace aortic valve regurgitation and trace mitral valve regurgitation.  He denies any chest pains, shortness of breath, palpitations, near-syncope, or syncope.          Allergies   Allergen Reactions   • Lisinopril Hives   :      Current Outpatient Medications:   •  amLODIPine (NORVASC) 10 MG tablet, Take 10 mg by mouth Daily., Disp: , Rfl:   •  aspirin 81 MG EC tablet, Take 1 tablet by mouth Daily., Disp: , Rfl:   •  atorvastatin (LIPITOR) 20 MG tablet, Take 1 tablet by mouth Daily., Disp: , Rfl:   •  buPROPion XL (WELLBUTRIN XL) 150 MG 24 hr tablet, Take 1 tablet by mouth Daily., Disp: , Rfl:   •  empagliflozin (JARDIANCE) 25 MG tablet tablet, Take 1 tablet by mouth Daily., Disp: , Rfl:   •  hydroCHLOROthiazide (HYDRODIURIL) 12.5 MG tablet, Take 1 tablet by mouth Daily., Disp: , Rfl:   •  levothyroxine (SYNTHROID, LEVOTHROID) 88 MCG tablet, Take 1 tablet by mouth Daily., Disp: , Rfl:   •  metFORMIN (GLUCOPHAGE) 1000 MG tablet, Take 1,000 mg by mouth 2 (Two) Times a Day., Disp: , Rfl:   •  pregabalin (LYRICA) 100 MG capsule, Take 100 mg by mouth 2 (Two) Times a Day., Disp: , Rfl:   •  metoprolol succinate XL (TOPROL-XL) 25 MG 24 hr tablet, Take 1 tablet  "by mouth Daily., Disp: 30 tablet, Rfl: 5      The following portions of the patient's history were reviewed and updated as appropriate: allergies, current medications, past family history, past medical history, past social history, past surgical history and problem list.    Social History     Tobacco Use   • Smoking status: Never   • Smokeless tobacco: Current     Types: Snuff   Vaping Use   • Vaping Use: Never used   Substance Use Topics   • Alcohol use: Never       Review of Systems   Constitutional: Negative for decreased appetite and malaise/fatigue.   Cardiovascular: Negative for chest pain, dyspnea on exertion and palpitations.   Respiratory: Negative for cough and shortness of breath.        Objective   Vitals:    01/18/23 0824   BP: 132/80   Pulse: 75   SpO2: 96%   Weight: 97.5 kg (215 lb)   Height: 172.7 cm (67.99\")     Body mass index is 32.7 kg/m².        Vitals reviewed.   Constitutional:       Appearance: Healthy appearance. Well-developed and not in distress.   HENT:      Head: Normocephalic and atraumatic.   Pulmonary:      Effort: Pulmonary effort is normal.      Breath sounds: Normal breath sounds. No wheezing. No rales.   Cardiovascular:      Normal rate. Regular rhythm.      Murmurs: There is a midsystolic murmur.      . No S3 and S4 gallop.   Edema:     Peripheral edema absent.   Abdominal:      General: Bowel sounds are normal.      Palpations: Abdomen is soft.   Skin:     General: Skin is warm and dry.   Neurological:      Mental Status: Alert, oriented to person, place, and time and oriented to person, place and time.   Psychiatric:         Mood and Affect: Mood normal.         Behavior: Behavior normal.         Procedures      Assessment & Plan    Diagnosis Plan   1. Asymmetric septal hypertrophy (HCC)  metoprolol succinate XL (TOPROL-XL) 25 MG 24 hr tablet      2. Essential hypertension                     Recommendations:    1. Asymmetric septal hypertrophy-I did discuss his plan of care " with Dr. Jernigan.  He recommends initiating beta-blocker therapy at this time.  If patient has any palpitations, near-syncope or syncope in the future, would evaluate further with an event monitor.  Will order metoprolol succinate 25 mg daily.  2. Essential hypertension-well-controlled.  3. Follow-up in 3 months or sooner if needed.        Return in about 3 months (around 4/18/2023) for Recheck.    As always, I appreciate very much the opportunity to participate in the cardiovascular care of your patients.      With Best Regards,    EMILY Coats

## 2023-01-19 RX ORDER — DULAGLUTIDE 1.5 MG/.5ML
INJECTION, SOLUTION SUBCUTANEOUS
COMMUNITY

## 2023-04-18 ENCOUNTER — OFFICE VISIT (OUTPATIENT)
Dept: CARDIOLOGY | Facility: CLINIC | Age: 48
End: 2023-04-18
Payer: COMMERCIAL

## 2023-04-18 VITALS
HEIGHT: 68 IN | WEIGHT: 227.2 LBS | BODY MASS INDEX: 34.43 KG/M2 | HEART RATE: 79 BPM | OXYGEN SATURATION: 97 % | SYSTOLIC BLOOD PRESSURE: 149 MMHG | DIASTOLIC BLOOD PRESSURE: 83 MMHG

## 2023-04-18 DIAGNOSIS — I10 ESSENTIAL HYPERTENSION: Primary | ICD-10-CM

## 2023-04-18 DIAGNOSIS — I42.2 ASYMMETRIC SEPTAL HYPERTROPHY: ICD-10-CM

## 2023-04-18 PROCEDURE — 93000 ELECTROCARDIOGRAM COMPLETE: CPT | Performed by: NURSE PRACTITIONER

## 2023-04-18 PROCEDURE — 99214 OFFICE O/P EST MOD 30 MIN: CPT | Performed by: NURSE PRACTITIONER

## 2023-04-18 RX ORDER — METOPROLOL SUCCINATE 50 MG/1
50 TABLET, EXTENDED RELEASE ORAL DAILY
Qty: 30 TABLET | Refills: 5 | Status: SHIPPED | OUTPATIENT
Start: 2023-04-18

## 2023-04-18 RX ORDER — TRAZODONE HYDROCHLORIDE 50 MG/1
TABLET ORAL
COMMUNITY
Start: 2023-02-09

## 2023-04-18 RX ORDER — BUSPIRONE HYDROCHLORIDE 15 MG/1
1 TABLET ORAL EVERY 12 HOURS SCHEDULED
COMMUNITY
Start: 2023-03-04

## 2023-04-18 NOTE — PROGRESS NOTES
Sherley Hopkins APRN  Joe Bobo  1975 04/18/2023    Patient Active Problem List   Diagnosis   • Controlled type 2 diabetes mellitus with neuropathy   • HTN (hypertension)   • Osteoarthritis of left AC (acromioclavicular) joint   • Primary osteoarthritis of left shoulder       Dear Sherley Hopkins, EMILY:    Subjective     Chief Complaint   Patient presents with   • Follow-up     3 mth   • Med Management     verbal           History of Present Illness:    Joe Bobo is a 47 y.o. male with a past medical history of hypertension, hypothyroidism, diabetes mellitus type 2 and septal asymmetric hypertrophy.  He presents today for routine cardiology follow-up.  Reports he is doing very well. The patient denies chest pain, shortness of breath, palpitations, dizziness, lightheadedness, near syncope, and syncope.            Allergies   Allergen Reactions   • Lisinopril Hives   :      Current Outpatient Medications:   •  amLODIPine (NORVASC) 10 MG tablet, Take 1 tablet by mouth Daily., Disp: , Rfl:   •  aspirin 81 MG EC tablet, Take 1 tablet by mouth Daily., Disp: , Rfl:   •  atorvastatin (LIPITOR) 20 MG tablet, Take 1 tablet by mouth Daily., Disp: , Rfl:   •  buPROPion XL (WELLBUTRIN XL) 150 MG 24 hr tablet, Take 1 tablet by mouth Daily., Disp: , Rfl:   •  busPIRone (BUSPAR) 15 MG tablet, Take 1 tablet by mouth Every 12 (Twelve) Hours., Disp: , Rfl:   •  Dulaglutide (Trulicity) 1.5 MG/0.5ML solution pen-injector, Inject  under the skin into the appropriate area as directed., Disp: , Rfl:   •  hydroCHLOROthiazide (HYDRODIURIL) 12.5 MG tablet, Take 1 tablet by mouth Daily., Disp: , Rfl:   •  levothyroxine (SYNTHROID, LEVOTHROID) 88 MCG tablet, Take 1 tablet by mouth Daily., Disp: , Rfl:   •  metFORMIN (GLUCOPHAGE) 1000 MG tablet, Take 1 tablet by mouth 2 (Two) Times a Day., Disp: , Rfl:   •  metoprolol succinate XL (TOPROL-XL) 50 MG 24 hr tablet, Take 1 tablet by mouth Daily., Disp: 30 tablet, Rfl: 5  •  " pregabalin (LYRICA) 100 MG capsule, Take 1 capsule by mouth 2 (Two) Times a Day., Disp: , Rfl:   •  traZODone (DESYREL) 50 MG tablet, TAKE 1 TO 2 TABLETS BY MOUTH ONCE DAILY AT BEDTIME AS NEEDED, Disp: , Rfl:       The following portions of the patient's history were reviewed and updated as appropriate: allergies, current medications, past family history, past medical history, past social history, past surgical history and problem list.    Social History     Tobacco Use   • Smoking status: Never   • Smokeless tobacco: Current     Types: Snuff   Vaping Use   • Vaping Use: Never used   Substance Use Topics   • Alcohol use: Never   • Drug use: Never       Review of Systems   Constitutional: Negative for decreased appetite and malaise/fatigue.   Cardiovascular: Negative for chest pain, dyspnea on exertion and palpitations.   Respiratory: Negative for cough and shortness of breath.        Objective   Vitals:    04/18/23 0821   BP: 149/83   BP Location: Left arm   Patient Position: Sitting   Cuff Size: Adult   Pulse: 79   SpO2: 97%   Weight: 103 kg (227 lb 3.2 oz)   Height: 172.7 cm (67.99\")     Body mass index is 34.56 kg/m².        Vitals reviewed.   Constitutional:       Appearance: Healthy appearance. Well-developed and not in distress.   HENT:      Head: Normocephalic and atraumatic.   Pulmonary:      Effort: Pulmonary effort is normal.      Breath sounds: Normal breath sounds. No wheezing. No rales.   Cardiovascular:      Normal rate. Regular rhythm.      Murmurs: There is a midsystolic murmur.      . No S3 and S4 gallop.   Edema:     Peripheral edema absent.   Abdominal:      General: Bowel sounds are normal.      Palpations: Abdomen is soft.   Skin:     General: Skin is warm and dry.   Neurological:      Mental Status: Alert, oriented to person, place, and time and oriented to person, place and time.   Psychiatric:         Mood and Affect: Mood normal.         Behavior: Behavior normal.                 ECG 12 " Lead    Date/Time: 4/18/2023 8:21 AM  Performed by: Meme Lipscomb APRN  Authorized by: Meme Lipscomb APRN   Comparison: compared with previous ECG   Similar to previous ECG  Rhythm: sinus rhythm  BPM: 76  Q waves: V1 and V2                  Assessment & Plan    Diagnosis Plan   1. Essential hypertension  ECG 12 Lead      2. Asymmetric septal hypertrophy  ECG 12 Lead    metoprolol succinate XL (TOPROL-XL) 50 MG 24 hr tablet                   Recommendations:    1. Essential hypertension-BP uncontrolled.  Increase metoprolol succinate to 50 mg daily.  Continue amlodipine.  2. Asymmetric septal hypertrophy-no recent palpitations, near-syncope, or syncope.  I did ask let us know if he develops any of the symptoms.  Would evaluate with event monitor at that time.  3. Follow-up in 6 months or sooner if needed.        Return in about 6 months (around 10/18/2023) for Recheck.    As always, I appreciate very much the opportunity to participate in the cardiovascular care of your patients.      With Best Regards,    EMILY Coats

## 2023-04-18 NOTE — LETTER
April 18, 2023     EMILY Rodriguez  [address] [id number]    Patient: Joe Bobo   YOB: 1975   Date of Visit: 4/18/2023       Dear EMILY Mendoza:    Thank you for referring Joe Bobo to me for evaluation. Below are the relevant portions of my assessment and plan of care.    If you have questions, please do not hesitate to call me. I look forward to following Joe along with you.         Sincerely,        EMILY Coats        CC: No Recipients    Meme Lipscomb APRN  04/18/23 0926  Signed  Sherley Hopkins APRN  Joe Bobo  1975 04/18/2023    Patient Active Problem List   Diagnosis   • Controlled type 2 diabetes mellitus with neuropathy   • HTN (hypertension)   • Osteoarthritis of left AC (acromioclavicular) joint   • Primary osteoarthritis of left shoulder       Dear Sherley Hopkins APRN:    Subjective      Chief Complaint   Patient presents with   • Follow-up     3 mth   • Med Management     verbal           History of Present Illness:    Joe Bobo is a 47 y.o. male with a past medical history of hypertension, hypothyroidism, diabetes mellitus type 2 and septal asymmetric hypertrophy.  He presents today for routine cardiology follow-up.  Reports he is doing very well. The patient denies chest pain, shortness of breath, palpitations, dizziness, lightheadedness, near syncope, and syncope.            Allergies   Allergen Reactions   • Lisinopril Hives   :      Current Outpatient Medications:   •  amLODIPine (NORVASC) 10 MG tablet, Take 1 tablet by mouth Daily., Disp: , Rfl:   •  aspirin 81 MG EC tablet, Take 1 tablet by mouth Daily., Disp: , Rfl:   •  atorvastatin (LIPITOR) 20 MG tablet, Take 1 tablet by mouth Daily., Disp: , Rfl:   •  buPROPion XL (WELLBUTRIN XL) 150 MG 24 hr tablet, Take 1 tablet by mouth Daily., Disp: , Rfl:   •  busPIRone (BUSPAR) 15 MG tablet, Take 1 tablet by mouth Every 12 (Twelve)  "Hours., Disp: , Rfl:   •  Dulaglutide (Trulicity) 1.5 MG/0.5ML solution pen-injector, Inject  under the skin into the appropriate area as directed., Disp: , Rfl:   •  hydroCHLOROthiazide (HYDRODIURIL) 12.5 MG tablet, Take 1 tablet by mouth Daily., Disp: , Rfl:   •  levothyroxine (SYNTHROID, LEVOTHROID) 88 MCG tablet, Take 1 tablet by mouth Daily., Disp: , Rfl:   •  metFORMIN (GLUCOPHAGE) 1000 MG tablet, Take 1 tablet by mouth 2 (Two) Times a Day., Disp: , Rfl:   •  metoprolol succinate XL (TOPROL-XL) 50 MG 24 hr tablet, Take 1 tablet by mouth Daily., Disp: 30 tablet, Rfl: 5  •  pregabalin (LYRICA) 100 MG capsule, Take 1 capsule by mouth 2 (Two) Times a Day., Disp: , Rfl:   •  traZODone (DESYREL) 50 MG tablet, TAKE 1 TO 2 TABLETS BY MOUTH ONCE DAILY AT BEDTIME AS NEEDED, Disp: , Rfl:       The following portions of the patient's history were reviewed and updated as appropriate: allergies, current medications, past family history, past medical history, past social history, past surgical history and problem list.    Social History     Tobacco Use   • Smoking status: Never   • Smokeless tobacco: Current     Types: Snuff   Vaping Use   • Vaping Use: Never used   Substance Use Topics   • Alcohol use: Never   • Drug use: Never       Review of Systems   Constitutional: Negative for decreased appetite and malaise/fatigue.   Cardiovascular: Negative for chest pain, dyspnea on exertion and palpitations.   Respiratory: Negative for cough and shortness of breath.        Objective    Vitals:    04/18/23 0821   BP: 149/83   BP Location: Left arm   Patient Position: Sitting   Cuff Size: Adult   Pulse: 79   SpO2: 97%   Weight: 103 kg (227 lb 3.2 oz)   Height: 172.7 cm (67.99\")     Body mass index is 34.56 kg/m².        Vitals reviewed.   Constitutional:       Appearance: Healthy appearance. Well-developed and not in distress.   HENT:      Head: Normocephalic and atraumatic.   Pulmonary:      Effort: Pulmonary effort is normal.      " Breath sounds: Normal breath sounds. No wheezing. No rales.   Cardiovascular:      Normal rate. Regular rhythm.      Murmurs: There is a midsystolic murmur.      . No S3 and S4 gallop.   Edema:     Peripheral edema absent.   Abdominal:      General: Bowel sounds are normal.      Palpations: Abdomen is soft.   Skin:     General: Skin is warm and dry.   Neurological:      Mental Status: Alert, oriented to person, place, and time and oriented to person, place and time.   Psychiatric:         Mood and Affect: Mood normal.         Behavior: Behavior normal.                 ECG 12 Lead    Date/Time: 4/18/2023 8:21 AM  Performed by: Meme Lipscomb APRN  Authorized by: Meme Lipscomb APRN   Comparison: compared with previous ECG   Similar to previous ECG  Rhythm: sinus rhythm  BPM: 76  Q waves: V1 and V2                  Assessment & Plan    Diagnosis Plan   1. Essential hypertension  ECG 12 Lead      2. Asymmetric septal hypertrophy  ECG 12 Lead    metoprolol succinate XL (TOPROL-XL) 50 MG 24 hr tablet                  Recommendations:    1. Essential hypertension-BP uncontrolled.  Increase metoprolol succinate to 50 mg daily.  Continue amlodipine.  2. Asymmetric septal hypertrophy-no recent palpitations, near-syncope, or syncope.  I did ask let us know if he develops any of the symptoms.  Would evaluate with event monitor at that time.  3. Follow-up in 6 months or sooner if needed.        Return in about 6 months (around 10/18/2023) for Recheck.    As always, I appreciate very much the opportunity to participate in the cardiovascular care of your patients.      With Best Regards,    EMILY Coats